# Patient Record
Sex: FEMALE | Race: WHITE | NOT HISPANIC OR LATINO
[De-identification: names, ages, dates, MRNs, and addresses within clinical notes are randomized per-mention and may not be internally consistent; named-entity substitution may affect disease eponyms.]

---

## 2022-06-13 ENCOUNTER — APPOINTMENT (OUTPATIENT)
Dept: ORTHOPEDIC SURGERY | Facility: CLINIC | Age: 87
End: 2022-06-13
Payer: MEDICARE

## 2022-06-13 VITALS — WEIGHT: 120 LBS | HEIGHT: 58 IN | BODY MASS INDEX: 25.19 KG/M2

## 2022-06-13 DIAGNOSIS — M65.331 TRIGGER FINGER, RIGHT MIDDLE FINGER: ICD-10-CM

## 2022-06-13 DIAGNOSIS — M65.341 TRIGGER FINGER, RIGHT RING FINGER: ICD-10-CM

## 2022-06-13 DIAGNOSIS — M65.321 TRIGGER FINGER, RIGHT INDEX FINGER: ICD-10-CM

## 2022-06-13 DIAGNOSIS — Z78.9 OTHER SPECIFIED HEALTH STATUS: ICD-10-CM

## 2022-06-13 DIAGNOSIS — I51.9 HEART DISEASE, UNSPECIFIED: ICD-10-CM

## 2022-06-13 DIAGNOSIS — I10 ESSENTIAL (PRIMARY) HYPERTENSION: ICD-10-CM

## 2022-06-13 PROBLEM — Z00.00 ENCOUNTER FOR PREVENTIVE HEALTH EXAMINATION: Status: ACTIVE | Noted: 2022-06-13

## 2022-06-13 PROCEDURE — 99214 OFFICE O/P EST MOD 30 MIN: CPT | Mod: 25

## 2022-06-13 PROCEDURE — 20550 NJX 1 TENDON SHEATH/LIGAMENT: CPT

## 2022-06-13 NOTE — HISTORY OF PRESENT ILLNESS
[Gradual] : gradual [4] : 4 [Dull/Aching] : dull/aching [Localized] : localized [Sharp] : sharp [Constant] : constant [Retired] : Work status: retired [de-identified] : 6/13/22:  Right RF triggering.  Pt has had 1 CSI for trigger finger before [] : Patient is currently injured and not playing sports: no

## 2022-06-13 NOTE — IMAGING
[de-identified] : right ring finger TTP a1 pulley\par +triggering\par otherwise farom\par neurovascular intact distally\par \par right index finger TTP a1 pulley\par +triggering\par otherwise farom\par neurovascular intact distally\par \par right middle finger TTP a1 pulley\par +triggering\par otherwise farom\par neurovascular intact distally\par \par

## 2022-06-13 NOTE — REASON FOR VISIT
[FreeTextEntry2] : New patient is here for right ring finger locks. Has seen other orthopedic  in the past wants cortisone today for trigger finger